# Patient Record
Sex: MALE
[De-identification: names, ages, dates, MRNs, and addresses within clinical notes are randomized per-mention and may not be internally consistent; named-entity substitution may affect disease eponyms.]

---

## 2017-05-21 ENCOUNTER — HOSPITAL ENCOUNTER (EMERGENCY)
Dept: HOSPITAL 45 - C.EDB | Age: 62
Discharge: HOME | End: 2017-05-21
Payer: COMMERCIAL

## 2017-05-21 VITALS — TEMPERATURE: 97.34 F

## 2017-05-21 VITALS — SYSTOLIC BLOOD PRESSURE: 177 MMHG | HEART RATE: 61 BPM | DIASTOLIC BLOOD PRESSURE: 109 MMHG | OXYGEN SATURATION: 97 %

## 2017-05-21 VITALS
BODY MASS INDEX: 32.72 KG/M2 | WEIGHT: 233.69 LBS | BODY MASS INDEX: 32.72 KG/M2 | WEIGHT: 233.69 LBS | HEIGHT: 70.98 IN | HEIGHT: 70.98 IN

## 2017-05-21 DIAGNOSIS — I10: ICD-10-CM

## 2017-05-21 DIAGNOSIS — Z79.82: ICD-10-CM
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
## 2017-05-21 DIAGNOSIS — E78.5: ICD-10-CM
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
## 2017-05-21 DIAGNOSIS — S70.01XA: Primary | ICD-10-CM

## 2017-05-21 NOTE — EMERGENCY ROOM VISIT NOTE
History


Report prepared by Cora:  Delvin Canales


Under the Supervision of:  Dr. Kee Rosado D.O.


First contact with patient:  16:41


Chief Complaint:  FALL


Stated Complaint:  HIP/BACK PAIN





History of Present Illness


The patient is a 61 year old male who presents to the Emergency Room with 

complaints of constant right hip pain s/p fall occurring 1.5 hours ago. He also 

complains of pain in his lower back. He states that he was shopping at [employer] when he slipped and fell on oil. The patient states that he landed on his 

right hip and lower back. He states that he was unable to get up for around 10 

minutes, but has been able to ambulate since. He did not hit his head or lose 

consciousness. The patient is not on any blood thinners. Pt denies headache, 

change in vision, fevers, chest pain, shortness of breath, numbness, weakness, 

nausea, vomiting, diarrhea, pain with urination, and melena. His tetanus is up 

to date.





   Source of History:  patient


   Onset:  1.5 hour ago


   Position:  pelvis (right)


   Timing:  constant


   Associated Symptoms:  No LOC, No SOB, No abdominal pain, No back pain, No 

chest pain, No fevers, No nausea, No numbness, No urinary symptoms, No vomiting

, No weakness


Note:


The patient also complains of lower back pain.





Review of Systems


See HPI for pertinent positives & negatives. A total of 10 systems reviewed and 

were otherwise negative.





Past Medical & Surgical


Medical Problems:


(1) HTN (hypertension)


(2) Hyperlipidemia








Family History


No pertinent family history stated.





Social History


Smoking Status:  Never Smoker





Current/Historical Medications


Scheduled


Aspirin (Aspirin Ec), 81 MG PO DAILY





Miscellaneous Medications


Metoprolol Succinate (Metoprolol Succinate ER)





Allergies


Coded Allergies:  


     No Known Allergies (Verified  Allergy, Mild, 1/10/04)


 SEASONAL ALLERGIES





Physical Exam


Vital Signs











  Date Time  Temp Pulse Resp B/P Pulse Ox O2 Delivery O2 Flow Rate FiO2


 


5/21/17 18:49  61 18 177/109 97 Room Air  


 


5/21/17 17:32  59 18 185/105 96 Room Air  


 


5/21/17 16:23 36.3 67 18 183/119 95 Room Air  











Physical Exam


GENERAL: Sitting up in bed, alert, well appearing, well nourished, no distress, 

non-toxic 


HEAD: normal cephalic, atraumatic 


EYE EXAM: normal conjunctiva, PERRL and EOM's grossly intact


OROPHARYNX: no exudate, no erythema, lips, buccal mucosa, and tongue normal and 

mucous membranes are moist


EARS: TMs clear b/l 


NECK: supple, no nuchal rigidity, no adenopathy, non-tender


CHEST: stable to compression anteriorly and posteriorly 


LUNGS: clear to auscultation. Normal chest wall mechanics


HEART: no murmurs, S1 normal and S2 normal 


ABDOMEN: abdomen soft, non-tender, normo-active bowel sounds, no masses, no 

rebound or guarding. 


PELVIS: stable to compression anteriorly and posteriorly Large contusion to the 

right proximal hip. 


BACK: Back is symmetrical on inspection and there is no deformity. Minimal 

discomfort and tenderness in the left lumbar paraspinal region. 


UPPER EXTREMITIES: full active and passive range of motion of all joints 

without tenderness to palpation 


LOWER EXTREMITIES: full active and passive range of motion of all joints 

without tenderness to palpation 


NEURO EXAM: Normal sensorium, cranial nerves II-XII grossly intact, normal 

speech,  no gross weakness of arms, no gross weakness of legs. GCS: 15.





Medical Decision & Procedures


ER Provider


Diagnostic Interpretation:


Radiology results as stated below per my review and the radiologist's 

interpretation: 





PELVIS 1 OR 2 VIEW ROUTINE, RIGHT FEMUR 2 VIEWS ROUTINE





FINDINGS: Lateral soft tissue swelling within the right hip. No fracture or


dislocation within the pelvis, hips, or right femur. The sacrum appears intact.


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


knee.





IMPRESSION:  Lateral soft tissue swelling within the right hip. No acute


fracture or dislocation within the pelvis, hips, right femur. 





Electronically signed by:  David Diego M.D.








LUMBAR SPINE 3 VIEWS





FINDINGS: There is no fracture.  No subluxation. Mild disc space narrowing at


L2-L3, L3-L4, and L4-L5. There are endplate osteophytes. Moderate facet


degenerative changes seen within the lower lumbar spine. Minimal anterior


wedging within the T11 vertebral body is likely chronic.





IMPRESSION:  


No fracture or subluxation within the lumbar spine.





Electronically signed by:  David Diego M.D.





ED Course


ED COURSE: 


Vital signs were reviewed and showed hypertension and tachycardia


The patients medical record was reviewed


The above diagnostic studies were performed and reviewed.


ED treatments and interventions as stated above. 





1648: The patient was evaluated in room C3. A complete history and physical 

examination was performed.





1814: The patient has returned from x-ray. 





1850: Upon reevaluation, the patient is resting comfortably. I discussed my 

findings with the patient and he understands and agrees with the treatment 

plan.   


Based on the patients age, coexisting illnesses, exam and lab findings the 

decision to treat as an outpatient was made.


The patient remained stable while under my care.


The patient appeared well at the time of discharge.





Medical Decision


Differential diagnoses include major intracranial, cervical, spinal, thoracic, 

abdominal, pelvic and neurologic injury.  Fracture, contusion, sprain, strain, 

laceration, abrasions included as well. 





Patient is a 61-year-old male who presents the ER following a fall for right 

hip pain.  On exam he has a large contusion.  He has full active and passive 

range of motion.  Takes no blood thinners.  No head or neck trauma.  Previous 

history of back surgeries.  X-rays of his pelvis, lumbar spine and right femur 

show no acute fractures.  He is able to ambulate.  He was discharged follow-up 

with his primary care doctor for a contusion of his right hip. Discussed with 

Pt concerning signs and symptoms to watch out for. Pt was instructed to follow 

up with their PCP and discussed with the patient their option to return to the 

ED at anytime for persistent or worsening symptoms. The appropriate 

anticipatory guidance and out-patient management, including indications for 

return to the emergency department, were explained at length to the patient and 

understood.





Impression





 Primary Impression:  


 Contusion, hip


 Additional Impression:  


 Fall





Scribe Attestation


The scribe's documentation has been prepared under my direction and personally 

reviewed by me in its entirety. I confirm that the note above accurately 

reflects all work, treatment, procedures, and medical decision making performed 

by me.





Departure Information


Dispostion


Home / Self-Care





Referrals


Malachi Beavers M.D. (PCP)





Forms


HOME CARE DOCUMENTATION FORM,                                                 

               IMPORTANT VISIT INFORMATION





Patient Instructions


ED Contusion Lower Ext, [facility]





Additional Instructions





Please follow up with your primary care doctor with in the next 24 hours.  Any 

worsening of your symptoms, please return to the ED immediately.  This includes 

numbness or weakness in the leg, worsening pain, inability to ambulate on it or 

any other concerning signs or symptoms from your standpoint.





If you continue to have pain over the next 3-5 days you may need repeat x-rays 

as there is a small chance that a hairline fracture could've been missed





Please take Tylenol as needed for fevers.





Problem Qualifiers








 Primary Impression:  


 Contusion, hip


 Encounter type:  initial encounter  Laterality:  right  Qualified Codes:  

S70.01XA - Contusion of right hip, initial encounter


 Additional Impression:  


 Fall


 Encounter type:  initial encounter  Qualified Codes:  W19.XXXA - Unspecified 

fall, initial encounter

## 2017-05-21 NOTE — DIAGNOSTIC IMAGING REPORT
PELVIS 1 OR 2 VIEW ROUTINE, RIGHT FEMUR 2 VIEWS ROUTINE



CLINICAL HISTORY: fall right hip pian    



COMPARISON STUDY:  None.



FINDINGS: Lateral soft tissue swelling within the right hip. No fracture or

dislocation within the pelvis, hips, or right femur. The sacrum appears intact.

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

knee.



IMPRESSION:  Lateral soft tissue swelling within the right hip. No acute

fracture or dislocation within the pelvis, hips, right femur. 









Electronically signed by:  David Diego M.D.

5/21/2017 6:24 PM



Dictated Date/Time:  5/21/2017 6:21 PM

## 2017-05-21 NOTE — DIAGNOSTIC IMAGING REPORT
LUMBAR SPINE 3 VIEWS



HISTORY: Low back pain.  fall right hip pian 



COMPARISON: None.



FINDINGS: There is no fracture.  No subluxation. Mild disc space narrowing at

L2-L3, L3-L4, and L4-L5. There are endplate osteophytes. Moderate facet

degenerative changes seen within the lower lumbar spine. Minimal anterior

wedging within the T11 vertebral body is likely chronic.



IMPRESSION:  

No fracture or subluxation within the lumbar spine.







Electronically signed by:  David Diego M.D.

5/21/2017 6:21 PM



Dictated Date/Time:  5/21/2017 6:19 PM

## 2017-06-02 ENCOUNTER — HOSPITAL ENCOUNTER (OUTPATIENT)
Dept: HOSPITAL 45 - C.RAD1850 | Age: 62
Discharge: HOME | End: 2017-06-02
Attending: FAMILY MEDICINE
Payer: COMMERCIAL

## 2017-06-02 DIAGNOSIS — R07.9: Primary | ICD-10-CM

## 2017-06-02 NOTE — DIAGNOSTIC IMAGING REPORT
RIGHT RIBS UNILATERAL WITH PA CHEST



CLINICAL HISTORY: Right anterior rib pain following trauma.    



COMPARISON STUDY:  No previous studies for comparison.



FINDINGS: There is no pneumothorax or pleural effusion. Lungs are clear. Cardiac

size is at the upper limits of normal. There is no evidence of pulmonary edema.

There are acute nondisplaced fractures of the anterior right sixth and seventh

ribs.



IMPRESSION:  Acute nondisplaced fractures of the anterior right sixth and

seventh ribs. No pneumothorax. 









Electronically signed by:  Mj Weber M.D.

6/2/2017 11:11 AM



Dictated Date/Time:  6/2/2017 10:53 AM

## 2017-12-12 ENCOUNTER — HOSPITAL ENCOUNTER (OUTPATIENT)
Dept: HOSPITAL 45 - C.LAB1850 | Age: 62
Discharge: HOME | End: 2017-12-12
Attending: INTERNAL MEDICINE
Payer: COMMERCIAL

## 2017-12-12 DIAGNOSIS — L50.9: ICD-10-CM

## 2017-12-12 DIAGNOSIS — L30.9: Primary | ICD-10-CM

## 2017-12-12 LAB
ALBUMIN/GLOB SERPL: 1.4 {RATIO} (ref 0.9–2)
ALP SERPL-CCNC: 64 U/L (ref 45–117)
ALT SERPL-CCNC: 25 U/L (ref 12–78)
ANION GAP SERPL CALC-SCNC: 5 MMOL/L (ref 3–11)
AST SERPL-CCNC: 17 U/L (ref 15–37)
BASOPHILS # BLD: 0.02 K/UL (ref 0–0.2)
BASOPHILS NFR BLD: 0.3 %
BUN SERPL-MCNC: 14 MG/DL (ref 7–18)
BUN/CREAT SERPL: 14 (ref 10–20)
CALCIUM SERPL-MCNC: 8.9 MG/DL (ref 8.5–10.1)
CHLORIDE SERPL-SCNC: 103 MMOL/L (ref 98–107)
CO2 SERPL-SCNC: 29 MMOL/L (ref 21–32)
COMPLETE: YES
CREAT SERPL-MCNC: 0.98 MG/DL (ref 0.6–1.4)
EOSINOPHIL NFR BLD AUTO: 151 K/UL (ref 130–400)
GLOBULIN SER-MCNC: 3 GM/DL (ref 2.5–4)
GLUCOSE SERPL-MCNC: 97 MG/DL (ref 70–99)
HCT VFR BLD CALC: 43.1 % (ref 42–52)
IG%: 1.4 %
IMM GRANULOCYTES NFR BLD AUTO: 15.7 %
IMMUNOGLOBULN A: 74.6 MG/DL (ref 70–400)
IMMUNOGLOBULN G: 937 MG/DL (ref 700–1600)
IMMUNOGLOBULN M: 69.8 MG/DL (ref 40–230)
LYMPHOCYTES # BLD: 0.99 K/UL (ref 1.2–3.4)
MCH RBC QN AUTO: 31.2 PG (ref 25–34)
MCHC RBC AUTO-ENTMCNC: 35.5 G/DL (ref 32–36)
MCV RBC AUTO: 87.8 FL (ref 80–100)
MONOCYTES NFR BLD: 8.5 %
NEUTROPHILS # BLD AUTO: 3.3 %
NEUTROPHILS NFR BLD AUTO: 70.8 %
PMV BLD AUTO: 11 FL (ref 7.4–10.4)
POTASSIUM SERPL-SCNC: 3.7 MMOL/L (ref 3.5–5.1)
RBC # BLD AUTO: 4.91 M/UL (ref 4.7–6.1)
SODIUM SERPL-SCNC: 137 MMOL/L (ref 136–145)
WBC # BLD AUTO: 6.32 K/UL (ref 4.8–10.8)

## 2018-05-17 ENCOUNTER — HOSPITAL ENCOUNTER (OUTPATIENT)
Dept: HOSPITAL 45 - C.LABSPEC | Age: 63
Discharge: HOME | End: 2018-05-17
Attending: PHYSICIAN ASSISTANT
Payer: COMMERCIAL

## 2018-05-17 DIAGNOSIS — R21: Primary | ICD-10-CM
